# Patient Record
(demographics unavailable — no encounter records)

---

## 2024-10-09 NOTE — PHYSICAL EXAM
[No Acute Distress] : no acute distress [Well Nourished] : well nourished [Well Developed] : well developed [Well-Appearing] : well-appearing [Normal Voice/Communication] : normal voice/communication [Normal Sclera/Conjunctiva] : normal sclera/conjunctiva [PERRL] : pupils equal round and reactive to light [EOMI] : extraocular movements intact [Normal Outer Ear/Nose] : the outer ears and nose were normal in appearance [Normal Oropharynx] : the oropharynx was normal [Normal TMs] : both tympanic membranes were normal [Normal Nasal Mucosa] : the nasal mucosa was normal [No JVD] : no jugular venous distention [No Lymphadenopathy] : no lymphadenopathy [Supple] : supple [Thyroid Normal, No Nodules] : the thyroid was normal and there were no nodules present [No Respiratory Distress] : no respiratory distress  [No Accessory Muscle Use] : no accessory muscle use [Clear to Auscultation] : lungs were clear to auscultation bilaterally [Normal Rate] : normal rate  [Regular Rhythm] : with a regular rhythm [Normal S1, S2] : normal S1 and S2 [No Carotid Bruits] : no carotid bruits [No Abdominal Bruit] : a ~M bruit was not heard ~T in the abdomen [Pedal Pulses Present] : the pedal pulses are present [No Edema] : there was no peripheral edema [No Palpable Aorta] : no palpable aorta [No Extremity Clubbing/Cyanosis] : no extremity clubbing/cyanosis [Soft] : abdomen soft [Non Tender] : non-tender [Non-distended] : non-distended [No Masses] : no abdominal mass palpated [No HSM] : no HSM [Normal Bowel Sounds] : normal bowel sounds [Normal Posterior Cervical Nodes] : no posterior cervical lymphadenopathy [Normal Anterior Cervical Nodes] : no anterior cervical lymphadenopathy [No CVA Tenderness] : no CVA  tenderness [No Spinal Tenderness] : no spinal tenderness [Kyphosis] : kyphosis [No Joint Swelling] : no joint swelling [Grossly Normal Strength/Tone] : grossly normal strength/tone [No Rash] : no rash [Thin Hair] : thin hair [Coordination Grossly Intact] : coordination grossly intact [No Focal Deficits] : no focal deficits [Normal Gait] : normal gait [Deep Tendon Reflexes (DTR)] : deep tendon reflexes were 2+ and symmetric [Speech Grossly Normal] : speech grossly normal [Memory Grossly Normal] : memory grossly normal [Normal Affect] : the affect was normal [Alert and Oriented x3] : oriented to person, place, and time [Normal Mood] : the mood was normal [Normal Insight/Judgement] : insight and judgment were intact [de-identified] : with murmur  [de-identified] : obese [de-identified] : sharply demarcated hyper erythmatous rash b/l groin area  [de-identified] :  tender rates/palp LS spine  -  significant thenar atrophy bilateral hands decreased range of motion of cervical spine [de-identified] : Right foot wrapped in wearing out walking boot as per wound care clinic with

## 2024-10-09 NOTE — DATA REVIEWED
[No studies available for review at this time.] : No studies available for review at this time. [FreeTextEntry1] : [facility] Reviewed reference number is [id number]

## 2024-10-09 NOTE — PLAN
[FreeTextEntry1] : Cardiology hypertension - continue Losartan Potassium 50mg p.o.q.d. as directed, Rx , continue Metoprolol Succinate ER 50mg p.o.q.d. as directed, Rx filled - continue low sodium diet.  Monitor BP. Refilled metoprolol today.  Advised patient to go for fasting blood work checking comprehensive metabolic as well as liver function test  hyperlipidemia - continue Rosuvastatin Calcium 20mg p.o.q.d. as directed - continue low cholesterol/low fat diet  CAD/MI - s/p stent placement (x3) - continue Clopidogrel Bisulfate 75mg p.o.q.d. as directed; continue Aspirin 81mg p.o.q.d. as directed; continue Rosuvastatin Calcium 20mg p.o.q.d. as directed; continue  Continue to follow up with cardiologist, Dr. Schmitt. Prescription given for fasting blood work checking fasting lipid panel, comprehensive metabolic, thyroid function test  Pulmonary occasional cigar smoker - previously discussed the importance of smoking cessation in reducing risk for CV disease/lung cancer/oral cancer  Endocrinology diabetes - ADA advised - continue ozempic subcutaneous injection weekly as directed; continue Toujeo 30-unit subcutaneous injection q.d. as directed; continue Jardiance 25mg p.o.q.d. as directed and Metformin HCl 1000mg BID p.o.q.d. as directed, Rx filled; continue Rosuvastatin Calcium 20mg p.o.q.d. as directed; continue Losartan Potassium 50mg p.o.q.d. as directed, Rx filled - continue low carbohydrate/low sugar diet; CV exercise limited by orthopedic issues - continue to follow up with endocrinologist, Dr. Clarke-  DM polyneuropathy-RX lyrica 200 mg po TID prn refilled 90 w/ refill ( checked [id number])- Refilled medication Check labs- follow up with Endocrinology Foot also-advise good glycemic control.  Follow-up podiatrist as well as wound care.  GI - advised the importance of colon cancer screening.  Patient reluctant to go for colonoscopy.  Ordered FIT DNA.  Education was provided. encouraged to reconsider going for a colonoscopy.  GERD - continue Pantoprazole Sodium 40mg p.o.q.d. as directed - continue antireflux measures. refilled protonix.  Neurology neuropathy - continue Lyrica (Pregabalin) 200mg tid p.o.q.d. as directed, Rx filled,  reviewed;    Rxn refilled.   Podiatry Continue to follow up with podiatrist, Dr. Beaulieu.-Continue wound care clinic until ulcer is healed.  May consider hyperbaric chamber  Musculoskeletal history of cervical spinal stenosis/myelopathy and lumbar back pain - s/p cervical spinal fusion; s/p lumbar laminectomy - continue Oxycodone-Acetaminophen 10-325mg p.o. p.r.n. as directed; continue Carisoprodol 350mg p.o. as directed, Rx filled,  reviewed. - Continue to follow up with neurosurgeon, Dr. Cabrera.   Psychiatry anxiety - continue Alprazolam 1mg p.o. p.r.n. as directed.   Patient really takes the medication.  Advised drug to drug interaction with muscle relaxer carisoprodol.   Urology BPH/ED - continue to follow up with urologist, Dr. Yu; prostate examination performed annually by Dr. Yu. Check PSA level (rxn given)  Neurology-short-term memory loss-advised to follow-up with neurology referral given.  Advised multivitamin daily.  Participated in reading her crossword puzzles.  Immunization will call local pharmacy for flu VAX information.  I spent 40 Minutes with the patient, half of which we discussed finding on physical exam  and coordinated care.  As well as reviewed my plans and follow ups. Dragon speech recognition software was used to create portions of this document.  An attempt at proofreading has been made to minimize errors please call if any questions arise.

## 2024-10-09 NOTE — COUNSELING
[Encouraged to increase physical activity] : Encouraged to increase physical activity [Decrease Portions] : decrease portions [Good understanding] : Patient has a good understanding of disease, goals and obesity follow-up plan [FreeTextEntry2] : ada ,  low-fat low-cholesterol low-sodium diet

## 2024-10-09 NOTE — HEALTH RISK ASSESSMENT
[Intercurrent Urgi Care visits] : went to urgent care [Yes] : Yes [2 - 3 times a week (3 pts)] : 2 - 3  times a week (3 points) [1 or 2 (0 pts)] : 1 or 2 (0 points) [Less than monthly (1 pt)] : Less than monthly (1 point) [No] : In the past 12 months have you used drugs other than those required for medical reasons? No [0] : 2) Feeling down, depressed, or hopeless: Not at all (0) [PHQ-2 Negative - No further assessment needed] : PHQ-2 Negative - No further assessment needed [Current] : Current [de-identified] : ear congestion/PND  [de-identified] : Jonah Clarke Endo,   Dr. Fredi Yu, uro,  Dr. Schmitt Cardio,  Dr. Alex Ortho,  Dr. Underwood Ortho, Dr. Oppenheim I&D,  Dr. Brittnee Mathews, Dr. Cabrera Neurosurgeon  [Audit-CScore] : 4 [[id number]] : 0 [de-identified] : cigar smoker one a week

## 2024-10-09 NOTE — REVIEW OF SYSTEMS
[Joint Pain] : joint pain [Joint Stiffness] : joint stiffness [Muscle Pain] : muscle pain [Back Pain] : back pain [Skin Rash] : skin rash [Anxiety] : anxiety [Negative] : Heme/Lymph [Memory Loss] : memory loss [Fever] : no fever [Chills] : no chills [Fatigue] : no fatigue [Discharge] : no discharge [Redness] : no redness [Itching] : no itching [Earache] : no earache [Nosebleeds] : no nosebleeds [Sore Throat] : no sore throat [Chest Pain] : no chest pain [Palpitations] : no palpitations [Claudication] : no  leg claudication [Lower Ext Edema] : no lower extremity edema [Orthopena] : no orthopnea [Shortness Of Breath] : no shortness of breath [Wheezing] : no wheezing [Cough] : no cough [Nausea] : no nausea [Constipation] : no constipation [Vomiting] : no vomiting [Melena] : no melena [Dysuria] : no dysuria [Incontinence] : no incontinence [Hesitancy] : no hesitancy [Nocturia] : no nocturia [Hematuria] : no hematuria [Joint Swelling] : no joint swelling [Headache] : no headache [Dizziness] : no dizziness [Fainting] : no fainting [Insomnia] : no insomnia [Easy Bleeding] : no easy bleeding [Easy Bruising] : no easy bruising [Swollen Glands] : no swollen glands [FreeTextEntry9] : neck, LBP [de-identified] : finger cyst  [de-identified] :  numbness lower extremity, short term memory loss  [FreeTextEntry1] : ulcer right foot

## 2024-10-09 NOTE — HISTORY OF PRESENT ILLNESS
[FreeTextEntry1] : Medication renewal, and medical update.  [de-identified] : Mr. CHAO is a 64 year-old-male, with a past medical history as noted below, who present to the office today for medication renewal. Patient is requesting renewal of Lyrica which she takes for diabetic neuropathy, metoprolol for hypertension/CAD, folic acid and propranolol which she takes for acid reflux.  Patient denies any adverse side effects to current medication regimen.  Continues to follow with pain management for chronic neck and lower back pain.  Patient states he takes oxycodone on a as needed basis for acute pain.  When he takes the medication does resolve his pain and discomfort. Patient states recently had a flu shot done at his pharmacy. Takes all medication as directed without side effects. Currently under wound care for right foot ulcer.  States he goes on a weekly basis for evaluation and treatment states it started to look like it is healing Patient states his endocrinologist discontinued Trulicity and started on his Ozempic.  Currently on Ozempic 1 mg weekly.

## 2024-10-09 NOTE — ASSESSMENT
[FreeTextEntry1] : Mr. CHAO is a 64 year-old male, with a past medical history as noted above, who present to the office today for medication renewal and follow-up from endocrinology, wound care clinic and discussion of memory loss
General (Pediatric)

## 2024-10-18 NOTE — END OF VISIT
[FreeTextEntry3] : Recommendation: Refill injectable products for erectile dysfunction.Follow up in one year or as needed.

## 2024-10-18 NOTE — HISTORY OF PRESENT ILLNESS
[FreeTextEntry1] : BRANDON CHAO is a 64 year male patient is here for a follow-up visit and reports doing well with the current treatment. The patient is seeking a refill on injectable products.

## 2024-10-18 NOTE — ADDENDUM
[FreeTextEntry1] : I, Danielle Melendez, acted as a scribe on behalf of Dr. Yu 10/17/2024.   All medical record entries made by Danielle Melendez were at my, Dr.Kip Yu, direction and personally dictated by me on  10/17/2024. I have reviewed the chart and agree that the record accurately reflects my personal performance of the history, physical exam, assessment, and plan. I have also personally directed, reviewed, and agreed with the chart.

## 2025-04-15 NOTE — HISTORY OF PRESENT ILLNESS
[FreeTextEntry1] :  annual wellness visit [de-identified] : New patient is a 64 year old male with a past medical history as below who presents for an annual wellness visit.   Patient has never had a screening colonoscopy. Patient received the flu vaccine for this season at [facility]. He believes he received the Pneumonia Vaccine at [facility] in the past. He has not received the Tetanus Vaccine in the past 10 years. He has received both doses of the Shingles (Shingrix) Vaccine. He is vaccinated against COVID-19 (J&J, x2).   Patient sees cardiologist, Dr. Schmitt given history of HTN, HLD, and CAD/MI: s/p stent placement x 3.  Patient sees endocrinologist, Dr. Clarke given history of DM; currently on Trulicity, Toujeo (30 units daily), Jardiance 25mg, and Metformin 1000mg BID;  Patient denies any gastrointestinal issues on Metformin. He denies any recent episodes of hypoglycemia.  Patient notes history of osteomyelitis; s/p courses of IV antibiotics. He had been seeing ID in the past. He regularly follows up with podiatrist, Dr. Beaulieu. He currently notes right heel wound that is not improving and he had vascular studies performed in [place] which demonstrated evidence of PAD.  He was referred by his cardiologist, Dr. Lauren for additional lower extremity arterial dopplers and consultation with vascular. He has gone to wound care without improvement. His 5th metatarsal was surgically removed in 2023.  Patient recently saw an optometrist for his eye exam.  Patient sees urologist, Dr. Yu given history of BPH and ED. He had been on Papaverine in the past for ED. He notes nocturia (1x per night). He gets an annual prostate exam performed by Dr. Yu yearly.  Patient notes history of cervical spinal stenosis/myelopathy and lumbar back pain; s/p cervical spinal fusion; s/p lumbar laminectomy. He had been seeing orthopedic surgeons, Dr. Alex and Dr. Underwood at [facility]; currently following with neurosurgeon, Dr. Cabrera. He takes Oxycodone-Acetaminophen and Carisoprodol infrequently/as needed for acute neck pain/back pain.   Patient notes bilateral lower extremity numbness (below knees). Patient is currently on Lyrica 200mg BID for neuropathy.  He notes tinnitis x months and requests referral to otolaryngologist.Ankures otalgia, otorrhea. He has had cerumen impaction in the past. He has tinea cruris which he has treated with nystatin powder in the past. He requests RX for this.

## 2025-04-15 NOTE — PHYSICAL EXAM
[No Acute Distress] : no acute distress [Well Nourished] : well nourished [Well Developed] : well developed [Well-Appearing] : well-appearing [Normal Voice/Communication] : normal voice/communication [Normal Sclera/Conjunctiva] : normal sclera/conjunctiva [PERRL] : pupils equal round and reactive to light [EOMI] : extraocular movements intact [Normal Outer Ear/Nose] : the outer ears and nose were normal in appearance [Normal Oropharynx] : the oropharynx was normal [Normal TMs] : both tympanic membranes were normal [Normal Nasal Mucosa] : the nasal mucosa was normal [No JVD] : no jugular venous distention [No Lymphadenopathy] : no lymphadenopathy [Supple] : supple [Thyroid Normal, No Nodules] : the thyroid was normal and there were no nodules present [No Respiratory Distress] : no respiratory distress  [No Accessory Muscle Use] : no accessory muscle use [Clear to Auscultation] : lungs were clear to auscultation bilaterally [Normal Rate] : normal rate  [Regular Rhythm] : with a regular rhythm [Normal S1, S2] : normal S1 and S2 [No Murmur] : no murmur heard [No Carotid Bruits] : no carotid bruits [No Abdominal Bruit] : a ~M bruit was not heard ~T in the abdomen [No Palpable Aorta] : no palpable aorta [Soft] : abdomen soft [Non Tender] : non-tender [Non-distended] : non-distended [No Masses] : no abdominal mass palpated [No HSM] : no HSM [Normal Bowel Sounds] : normal bowel sounds [No Hernias] : no hernias [Declined Rectal Exam] : declined rectal exam [Normal Supraclavicular Nodes] : no supraclavicular lymphadenopathy [Normal Posterior Cervical Nodes] : no posterior cervical lymphadenopathy [Normal Anterior Cervical Nodes] : no anterior cervical lymphadenopathy [No CVA Tenderness] : no CVA  tenderness [No Spinal Tenderness] : no spinal tenderness [Kyphosis] : no kyphosis [Scoliosis] : no scoliosis [No Joint Swelling] : no joint swelling [Grossly Normal Strength/Tone] : grossly normal strength/tone [Acne] : no acne [Coordination Grossly Intact] : coordination grossly intact [No Focal Deficits] : no focal deficits [Normal Gait] : normal gait [Deep Tendon Reflexes (DTR)] : deep tendon reflexes were 2+ and symmetric [Speech Grossly Normal] : speech grossly normal [Memory Grossly Normal] : memory grossly normal [Normal Affect] : the affect was normal [Alert and Oriented x3] : oriented to person, place, and time [Normal Mood] : the mood was normal [Normal Insight/Judgement] : insight and judgment were intact [de-identified] : upper dentures  [de-identified] : trace ankle pitting edema bilaterally [de-identified] : obese [de-identified] : skin breakdown right heel/foot, confluent, erythematous macular rash bilateral inner thigh [FreeTextEntry1] : rectal/prostate exam done yearly by Dr. Yu

## 2025-04-15 NOTE — ADDENDUM
[FreeTextEntry1] : I, Que Escobar, acted solely as scribe for Dr. Sheldon Tijerina DO on this date 10/31/2022  9:00AM .\par  \par  All medical record entries made by the Scribe were at my, Dr. Sheldon Tijerina DO direction and personally dictated by me on 10/31/2022  9:00AM. I have reviewed the chart and agree that the record accurately reflects my personal performance of the history, physical exam, assessment and plan. I have also personally directed, reviewed and agreed with the chart.

## 2025-04-15 NOTE — ASSESSMENT
[FreeTextEntry1] : New patient is a 62 year old male with a past medical history as above who presents for an annual wellness visit.\par   [Vaccines Reviewed] : Immunizations reviewed today. Please see immunization details in the vaccine log within the immunization flowsheet.

## 2025-04-15 NOTE — HEALTH RISK ASSESSMENT
[Yes] : Yes [2 - 3 times a week (3 pts)] : 2 - 3  times a week (3 points) [1 or 2 (0 pts)] : 1 or 2 (0 points) [Less than monthly (1 pt)] : Less than monthly (1 point) [No] : In the past 12 months have you used drugs other than those required for medical reasons? No [0] : 2) Feeling down, depressed, or hopeless: Not at all (0) [PHQ-2 Negative - No further assessment needed] : PHQ-2 Negative - No further assessment needed [Audit-CScore] : 4 [[id number]] : 0 [ColonoscopyComments] : Discussed seeing GI for consultation as patient is due for a screening colonoscopy, patient to consider and follow up for referral; also discussed FIT Test: kit given.

## 2025-04-15 NOTE — PLAN
[FreeTextEntry1] : Ophthalmology Results of recent eye exam to be requested from [facility] in [place] Pulmonary occasional cigar smoker - check EKG (results as above) - discussed the importance of smoking cessation in reducing risk for CV disease/lung cancer/oral cancer Cardiology hypertension - check EKG (results as above) - continue Losartan Potassium 50mg p.o.q.d. as directed; continue Metoprolol Succinate ER 50mg p.o.q.d. as directed - continue low sodium diet; will continue to monitor BP hyperlipidemia - continue Rosuvastatin Calcium 20mg p.o.q.d. as directed - continue low cholesterol/low fat diet  CAD/MI - s/p stent placement (x3) - continue Clopidogrel Bisulfate 75mg p.o.q.d. as directed; continue Aspirin 81mg p.o.q.d. as directed; continue Rosuvastatin Calcium 20mg p.o.q.d. as directed; continue Metoprolol Succinate ER 50mg p.o.q.d. as directed  Continue to follow up with cardiologist, Dr. Schmitt Endocrinology diabetes - continue Ozempic subcutaneous injection q.w. as directed; continue Toujeo 30-unit subcutaneous injection q.d. as directed; continue Jardiance 25mg p.o.q.d. as directed and Metformin HCl 1000mg BID p.o.q.d. as directed; continue Rosuvastatin Calcium 20mg p.o.q.d. as directed; continue Losartan Potassium 50mg p.o.q.d. as directed - continue low carbohydrate/low sugar diet; CV exercise limited by orthopedic issues - continue to follow up with endocrinologist, Dr. Clarke obesity - continue low carbohydrate diet; CV exercise limited by orthopedic issues  Gastroenterology Discussed seeing GI for consultation as patient is due for a screening colonoscopy, patient to consider and follow up for referral; also discussed FIT Test: kit given GERD - continue Pantoprazole Sodium 40mg p.o.q.d. as directed, Rx filled - continue antireflux measures Neurology neuropathy - continue Lyrica (Pregabalin) 200mg BID p.o.q.d. as directed Podiatry onychomycosis - if interested in treatment (Lamisil vs. laser), recommended following up with podiatrist, Dr. Beaulieu  Continue to follow up with Dr. Beaulieu given history of DM and osteomyelitis  Musculoskeletal history of cervical spinal stenosis/myelopathy and lumbar back pain - s/p cervical spinal fusion; s/p lumbar laminectomy - continue Oxycodone-Acetaminophen 10-325mg p.o. p.r.n. as directed; continue Carisoprodol 350mg p.o. as directed - continue to follow up with neurosurgeon, Dr. Cabrera.  Psychiatry anxiety - continue Alprazolam 1mg p.o. p.r.n. as directed Urology BPH/ED - continue to follow up with urologist, Dr. Yu; prostate examination performed annually by Dr. Yu Dermatology tinea cruris-RX lotrisone cream apply small amount BID x 10 days and prn Otolaryngology left tinnitis-referred to ENT, Dr. Benjamín Rosas for evaluation Vascular PAD-to see vascular and get imaging of lower extremity arteries-may require revascularization vs angioplasty w stent Immunization Patient's immunization records to be requested from [facility] Tdap (Adacel) Vaccine - discussed, patient to consider and follow up for vaccine administration if interested S/p COVID-19 Vaccine (x2) - recommended following up at pharmacy for updated COVID-19 vaccine booster  RX given for fasting blood work

## 2025-04-15 NOTE — REVIEW OF SYSTEMS
[Nocturia] : nocturia [Impotence] : impotence [Back Pain] : back pain [Itching] : itching [Skin Rash] : skin rash [Negative] : Heme/Lymph [FreeTextEntry4] : left tinnitis [de-identified] : lower extremity numbness

## 2025-04-18 NOTE — REASON FOR VISIT
[Consultation] : a consultation regarding [Peripheral Vascular Disease] : peripheral vascular disease [FreeTextEntry1] : 65 yo M with PMHx of CAD s/ PCI to LAD and Ramus, HTN, HL, DM, OM and Ulcer to R Plantar surface 4 years ago healed conservative management, offloading, ABX, OM and wound of L Hallux 12/2021 post trauma, treated with ABX and healed, and recurrent OM s/p 5th Ray excision 2/2024, here for vascular medicine evaluation for nonhealing right foot ulcer.   Small nonhealing wound on R heel. Has been dealing with this for months. Has been doing wound care . Sees Dr. Beaulieu.  He spends time in [place], had a recent arterial duplex done there with a ?SFA stenosis. No prior imaging. He has an arterial duplex set-up for Friday at noon at [facility].   Had MEME in 7/2018, RABI 1.78, LABI 1.86, calcified vessels and not accurate, was recommended he got a CTA Abd/Pelvis.   8/1/2024 TBI: The right TBI of 0.92 and the left TBI of 0.78 are normal. Significant lower extremity arterial vascular disease is not identified.  Arterial Duplex 10/25/2023: Artificially elevated ABIs bilaterally which limit accurate evaluation. Preserved bilateral lower extremity PVR amplitudes. Low impedance monophasic waveforms in the right calf may reflect hyperemia due to inflammation associated with the foot wound. Elevated velocities of the right dorsalis pedis artery, suggestive of flow-limiting stenosis. The left peroneal artery is occluded throughout.

## 2025-04-18 NOTE — REVIEW OF SYSTEMS
[Weight Gain (___ Lbs)] : [unfilled] ~Ulb weight gain [Feeling Fatigued] : feeling fatigued [Lower Ext Edema] : lower extremity edema [Leg Claudication] : intermittent leg claudication [Joint Pain] : joint pain [Joint Stiffness] : joint stiffness [Change In Color Of Skin] : change in skin color [Skin Lesions] : skin lesion(s): [Negative] : Heme/Lymph [Fever] : no fever [Chills] : no chills

## 2025-04-18 NOTE — REASON FOR VISIT
[Consultation] : a consultation regarding [Peripheral Vascular Disease] : peripheral vascular disease [FreeTextEntry1] : 65 yo M with PMHx of CAD s/ PCI to LAD and Ramus, HTN, HL, DM, OM and Ulcer to R Plantar surface 4 years ago healed conservative management, offloading, ABX, OM and wound of L Hallux 12/2021 post trauma, treated with ABX and healed, and recurrent OM s/p 5th Ray excision 2/2024, here for vascular medicine evaluation for nonhealing right foot ulcer.   Small nonhealing wound on R heel. Has been dealing with this for months. Has been doing wound care . Sees Dr. Beaulieu.  He spends time in [place], had a recent arterial duplex done there with a ?SFA stenosis. No prior imaging. He has an arterial duplex set-up for Friday at noon at [facility].   Had MEME in 7/2018, RABI 1.78, LABI 1.86, calcified vessels and not accurate, was recommended he got a CTA Abd/Pelvis.   8/1/2024 TBI: The right TBI of 0.92 and the left TBI of 0.78 are normal. Significant lower extremity arterial vascular disease is not identified.  Arterial Duplex 10/25/2023: Artificially elevated ABIs bilaterally which limit accurate evaluation. Preserved bilateral lower extremity PVR amplitudes. Low impedance monophasic waveforms in the right calf may reflect hyperemia due to inflammation associated with the foot wound. Elevated velocities of the right dorsalis pedis artery, suggestive of flow-limiting stenosis. The left peroneal artery is occluded throughout.

## 2025-04-18 NOTE — PHYSICAL EXAM
[Eyelids - No Xanthelasma] : the eyelids demonstrated no xanthelasmas [No Oral Cyanosis] : no oral cyanosis [Heart Rate And Rhythm] : heart rate and rhythm were normal [Heart Sounds] : normal S1 and S2 [Murmurs] : no murmurs present [Abdomen Soft] : soft [Abdomen Tenderness] : non-tender [Oriented To Time, Place, And Person] : oriented to person, place, and time [Impaired Insight] : insight and judgment were intact [Affect] : the affect was normal [FreeTextEntry1] : early evidence of venous insufficiency, right heel ulcer , dopplerable DP

## 2025-05-06 NOTE — PHYSICAL EXAM
[No Acute Distress] : no acute distress [Well Nourished] : well nourished [Well Developed] : well developed [Well-Appearing] : well-appearing [Normal Voice/Communication] : normal voice/communication [Normal Sclera/Conjunctiva] : normal sclera/conjunctiva [PERRL] : pupils equal round and reactive to light [EOMI] : extraocular movements intact [Normal Outer Ear/Nose] : the outer ears and nose were normal in appearance [Normal Oropharynx] : the oropharynx was normal [Normal TMs] : both tympanic membranes were normal [Normal Nasal Mucosa] : the nasal mucosa was normal [No JVD] : no jugular venous distention [No Lymphadenopathy] : no lymphadenopathy [Supple] : supple [Thyroid Normal, No Nodules] : the thyroid was normal and there were no nodules present [No Respiratory Distress] : no respiratory distress  [No Accessory Muscle Use] : no accessory muscle use [Normal Rate] : normal rate  [Regular Rhythm] : with a regular rhythm [Normal S1, S2] : normal S1 and S2 [No Carotid Bruits] : no carotid bruits [No Abdominal Bruit] : a ~M bruit was not heard ~T in the abdomen [Pedal Pulses Present] : the pedal pulses are present [No Palpable Aorta] : no palpable aorta [No Extremity Clubbing/Cyanosis] : no extremity clubbing/cyanosis [Soft] : abdomen soft [Non Tender] : non-tender [Non-distended] : non-distended [No Masses] : no abdominal mass palpated [No HSM] : no HSM [Normal Bowel Sounds] : normal bowel sounds [Normal Posterior Cervical Nodes] : no posterior cervical lymphadenopathy [Normal Anterior Cervical Nodes] : no anterior cervical lymphadenopathy [No CVA Tenderness] : no CVA  tenderness [No Spinal Tenderness] : no spinal tenderness [Kyphosis] : kyphosis [No Joint Swelling] : no joint swelling [Grossly Normal Strength/Tone] : grossly normal strength/tone [No Rash] : no rash [Thin Hair] : thin hair [Coordination Grossly Intact] : coordination grossly intact [No Focal Deficits] : no focal deficits [Normal Gait] : normal gait [Deep Tendon Reflexes (DTR)] : deep tendon reflexes were 2+ and symmetric [Speech Grossly Normal] : speech grossly normal [Memory Grossly Normal] : memory grossly normal [Normal Affect] : the affect was normal [Alert and Oriented x3] : oriented to person, place, and time [Normal Mood] : the mood was normal [Normal Insight/Judgement] : insight and judgment were intact [de-identified] : Inspiratory wheezing [de-identified] : with murmur  [de-identified] : Right lower extremity edema slightly tender to palp of the calf muscle on the right lower extremity [de-identified] : obese [de-identified] : e [de-identified] : Right foot wrapped in wearing out walking boot as per wound care clinic with

## 2025-05-06 NOTE — HEALTH RISK ASSESSMENT
[Yes] : Yes [2 - 3 times a week (3 pts)] : 2 - 3  times a week (3 points) [1 or 2 (0 pts)] : 1 or 2 (0 points) [Less than monthly (1 pt)] : Less than monthly (1 point) [No] : In the past 12 months have you used drugs other than those required for medical reasons? No [0] : 2) Feeling down, depressed, or hopeless: Not at all (0) [PHQ-2 Negative - No further assessment needed] : PHQ-2 Negative - No further assessment needed [Audit-CScore] : 4 [[id number]] : 0 [Current] : Current [de-identified] : cigar [ColonoscopyComments] : Discussed seeing GI for consultation as patient is due for a screening colonoscopy, patient to consider and follow up for referral; also discussed FIT Test: kit given.

## 2025-05-06 NOTE — REVIEW OF SYSTEMS
[Joint Pain] : joint pain [Joint Stiffness] : joint stiffness [Muscle Pain] : muscle pain [Back Pain] : back pain [Skin Rash] : skin rash [Memory Loss] : memory loss [Anxiety] : anxiety [Negative] : Heme/Lymph [Fever] : no fever [Chills] : no chills [Fatigue] : no fatigue [Discharge] : no discharge [Redness] : no redness [Itching] : no itching [Earache] : no earache [Nosebleeds] : no nosebleeds [Nasal Discharge] : nasal discharge [Sore Throat] : no sore throat [Chest Pain] : no chest pain [Palpitations] : no palpitations [Claudication] : no  leg claudication [Lower Ext Edema] : no lower extremity edema [Orthopena] : no orthopnea [Shortness Of Breath] : no shortness of breath [Wheezing] : wheezing [Cough] : no cough [Nausea] : no nausea [Constipation] : no constipation [Vomiting] : no vomiting [Melena] : no melena [Dysuria] : no dysuria [Incontinence] : no incontinence [Hesitancy] : no hesitancy [Nocturia] : no nocturia [Hematuria] : no hematuria [Joint Swelling] : no joint swelling [Headache] : no headache [Dizziness] : no dizziness [Fainting] : no fainting [Insomnia] : no insomnia [Easy Bleeding] : no easy bleeding [Easy Bruising] : no easy bruising [Swollen Glands] : no swollen glands [FreeTextEntry9] : neck, LBP [de-identified] : fut ulcer  [de-identified] :  numbness lower extremity, short term memory loss  [FreeTextEntry1] : ulcer right foot

## 2025-05-06 NOTE — HISTORY OF PRESENT ILLNESS
[Cold Symptoms] : cold symptoms [Moderate] : moderate [___ Weeks ago] :  [unfilled] weeks ago [Gradual] : gradually [Constant] : constant [Congestion] : congestion [Cough] : cough [Sore Throat] : no sore throat [Wheezing] : no wheezing [Chills] : no chills [Anorexia] : no anorexia [Shortness Of Breath] : no shortness of breath [Earache] : no earache [Fatigue] : not fatigue [Headache] : no headache [Fever] : no fever [Activity] : with activity [Stable] : stable [FreeTextEntry8] : Patient denies take any home COVID test.  States that he is wheezing at nighttime.  Denies taking any medication over the counter.  Denies chest pain shortness of breath.  Recently been seen in podiatry as well as vascular secondary to right foot nonhealing ulcer.  Recently had a angiogram which was normal.  Had arterial Doppler lower extremity which was normal.  Still with leg swelling right lower extremity states as the day goes on leg gets larger.  Unaware what makes it better or worse.  Denies going for a venous Doppler.  Patient states he has a prescription to go for fasting blood work given by Dr. Tijerina has not gone for a as of yet planning to do it later this week

## 2025-05-06 NOTE — DATA REVIEWED
[No studies available for review at this time.] : No studies available for review at this time. [FreeTextEntry1] : [facility] Reviewed reference number is [id number]

## 2025-05-06 NOTE — PLAN
[FreeTextEntry1] : Cardiology hypertension - continue Losartan Potassium 50mg p.o.q.d. as directed, Rx , continue Metoprolol Succinate ER 50mg p.o.q.d. as directed, Rx filled - continue low sodium diet.  Monitor BP.  Advised patient to go for fasting blood work checking comprehensive metabolic as well as liver function test  hyperlipidemia - continue Rosuvastatin Calcium 20mg p.o.q.d. as directed - continue low cholesterol/low fat diet  CAD/MI - s/p stent placement (x3) - continue Clopidogrel Bisulfate 75mg p.o.q.d. as directed; continue Aspirin 81mg p.o.q.d. as directed; continue Rosuvastatin Calcium 20mg p.o.q.d. as directed; continue  Continue to follow up with cardiologist, Dr. Schmitt. pt has prescription  for fasting blood work   Vascular-peripheral edema-leg pain-referred patient for venous sonogram right lower extremity to rule out DVT.  Advised ROXANA stockings.  Follow-up with vascular.  Start hydrochlorothiazide 12.5.  Elevate leg.  Pulmonary occasional cigar smoker - previously discussed the importance of smoking cessation in reducing risk for CV disease/lung cancer/oral cancer  Endocrinology diabetes - ADA advised - continue Ozempic subcutaneous injection weekly as directed; continue Toujou 30-unit subcutaneous injection q.d. as directed; continue Jardiance 25mg p.o.q.d. as directed and Metformin HCl 1000mg BID p.o.q.d. as directed, Rx filled; continue Rosuvastatin Calcium 20mg p.o.q.d. as directed; continue Losartan Potassium 50mg p.o.q.d. as directed, Rx filled - continue low carbohydrate/low sugar diet; CV exercise limited by orthopedic issues - continue to follow up with endocrinologist, Dr. Clarke- Check a1c   DM polyneuropathy-RX lyrica 200 mg po TID prn refilled 90 w/ refill ( checked [id number])- Refilled medication Check labs- follow up with Endocrinology Foot ulcer -advise good glycemic control.  Follow-up podiatrist as well as wound care.  GERD - continue Pantoprazole Sodium 40mg p.o.q.d. as directed - continue Anti reflux measures. refilled protonix.  Neurology neuropathy - continue Lyrica (Pregabalin) 200mg tid p.o.q.d. as directed, Rx filled,  reviewed.    Podiatry Continue to follow up with podiatrist, Dr. Beaulieu.-Continue wound care clinic until ulcer is healed.  May consider hyperbaric chamber  Musculoskeletal history of cervical spinal stenosis/myelopathy and lumbar back pain - s/p cervical spinal fusion; s/p lumbar laminectomy - continue Oxycodone-Acetaminophen 10-325mg p.o. p.r.n. as directed; continue Carisoprodol 350mg p.o. as directed, Rx filled,  reviewed. - Continue to follow up with neurosurgeon, Dr. Cabrera.   Psychiatry anxiety - continue Alprazolam 1mg p.o. p.r.n. as directed.   Patient really takes the medication.  Advised drug to drug interaction with muscle relaxer carisoprodol.   Urology BPH/ED - continue to follow up with urologist, Dr. Yu; prostate examination performed annually by Dr. Yu.   Neurology-short-term memory loss-advised to follow-up with neurology referral given.  Advised multivitamin daily.    I spent 35 Minutes with the patient, half of which we discussed finding on physical exam  and coordinated care.  As well as reviewed my plans and follow ups. Dragon speech recognition software was used to create portions of this document.  An attempt at proofreading has been made to minimize errors please call if any questions arise.

## 2025-05-06 NOTE — ASSESSMENT
[FreeTextEntry1] : Mr. CHAO is a 64 year-old male, with a past medical history as noted above, who present to the office today for acute upper respiratory infection- and right lower extremity peripheral edema/ leg pain
Positive FIT (fecal immunochemical test)

## 2025-06-09 NOTE — ASSESSMENT
[FreeTextEntry1] : Mr. CHAO is a 64 year-old male, with a past medical history as noted above, who present to the office today for  right lower extremity peripheral edema/ leg pain

## 2025-06-09 NOTE — REVIEW OF SYSTEMS
[Nasal Discharge] : nasal discharge [Joint Pain] : joint pain [Joint Stiffness] : joint stiffness [Muscle Pain] : muscle pain [Back Pain] : back pain [Skin Rash] : skin rash [Memory Loss] : memory loss [Anxiety] : anxiety [Chills] : no chills [Fever] : no fever [Fatigue] : no fatigue [Discharge] : no discharge [Redness] : no redness [Itching] : no itching [Earache] : no earache [Sore Throat] : no sore throat [Nosebleeds] : no nosebleeds [Palpitations] : no palpitations [Chest Pain] : no chest pain [Claudication] : no  leg claudication [Lower Ext Edema] : lower extremity edema [Shortness Of Breath] : no shortness of breath [Orthopena] : no orthopnea [Wheezing] : no wheezing [Cough] : no cough [Nausea] : no nausea [Dyspnea on Exertion] : not dyspnea on exertion [Melena] : no melena [Vomiting] : no vomiting [Constipation] : no constipation [Incontinence] : no incontinence [Dysuria] : no dysuria [Nocturia] : no nocturia [Hesitancy] : no hesitancy [Joint Swelling] : no joint swelling [Hematuria] : no hematuria [Dizziness] : no dizziness [Headache] : no headache [Fainting] : no fainting [Insomnia] : no insomnia [Easy Bruising] : no easy bruising [Easy Bleeding] : no easy bleeding [Swollen Glands] : no swollen glands [Negative] : Respiratory [FreeTextEntry9] : neck, LBP [de-identified] : fut ulcer  [de-identified] :  numbness lower extremity, short term memory loss  [FreeTextEntry1] : ulcer right foot

## 2025-06-09 NOTE — HISTORY OF PRESENT ILLNESS
[FreeTextEntry1] : RLE swelling and pain  [de-identified] : Mr. CHAO is a 64 year-old-male, with a past medical history as noted below, who present to the office today for persistent RLE swelling and pain- Saw podiatry this am had DM ulcer cleaned and dressing applied. States he took HCTZ w/o any decrease in swelling.  Use compression sock w/o any relief states seemed like it caused more pain and swelling. Denies leg cramps  Seeing agatha on Monday

## 2025-06-09 NOTE — PLAN
[FreeTextEntry1] : Cardiology hypertension - continue Losartan Potassium 50mg p.o.q.d. as directed, Rx , continue Metoprolol Succinate ER 50mg p.o.q.d. as directed, Rx filled - continue low sodium diet.  Monitor BP.  Advised patient to go for fasting blood work checking comprehensive metabolic as well as liver function test  hyperlipidemia - continue Rosuvastatin Calcium 20mg p.o.q.d. as directed - continue low cholesterol/low fat diet  CAD/MI - s/p stent placement (x3) - continue Clopidogrel Bisulfate 75mg p.o.q.d. as directed; continue Aspirin 81mg p.o.q.d. as directed; continue Rosuvastatin Calcium 20mg p.o.q.d. as directed; continue  Continue to follow up with cardiologist, Dr. Schmitt. Check FLP/ LFT and renal function  Vascular-peripheral edema-leg pain- Elevated leg advised compression. Low na diet  Check labs - Start Lasix 20 mg daily - NO change consider titration Follow up with [facility]  Pulmonary occasional cigar smoker - previously discussed the importance of smoking cessation in reducing risk for CV disease/lung cancer/oral cancer  Endocrinology diabetes - ADA advised - continue Ozempic subcutaneous injection weekly as directed; continue Toujou 30-unit subcutaneous injection q.d. as directed; continue Jardiance 25mg p.o.q.d. as directed and Metformin HCl 1000mg BID p.o.q.d. as directed, Rx filled; continue Rosuvastatin Calcium 20mg p.o.q.d. as directed; continue Losartan Potassium 50mg p.o.q.d. as directed, Rx filled - continue low carbohydrate/low sugar diet; CV exercise limited by orthopedic issues - continue to follow up with endocrinologist, Dr. Clarke- Check a1c   DM polyneuropathy-RX lyrica 200 mg po TID  Check labs- follow up with Endocrinology  Foot ulcer -advise good glycemic control.  Follow-up podiatrist as well as wound care.  GERD - continue Pantoprazole Sodium 40mg p.o.q.d. as directed - continue Anti reflux measures.  Neurology neuropathy - continue Lyrica (Pregabalin) 200mg tid p.o.q.d. as directed,     Podiatry Continue to follow up with podiatrist, Dr. Beaulieu.-Continue wound care clinic until ulcer is healed.  May consider hyperbaric chamber  Musculoskeletal history of cervical spinal stenosis/myelopathy and lumbar back pain - s/p cervical spinal fusion; s/p lumbar laminectomy - Continue to follow up with neurosurgeon, Dr. Cabrera.   Psychiatry anxiety - continue Alprazolam 1mg p.o. p.r.n. as directed.   Patient really takes the medication.  Advised drug to drug interaction with muscle relaxer carisoprodol.   Urology BPH/ED - continue to follow up with urologist, Dr. Yu; prostate examination performed annually by Dr. Yu.   Neurology-short-term memory loss-advised to follow-up with neurology referral given.  Advised multivitamin daily.    I spent 35 Minutes with the patient, half of which we discussed finding on physical exam and coordinated care.  As well as reviewed my plans and follow ups. Dragon speech recognition software was used to create portions of this document.  An attempt at proofreading has been made to minimize errors please call if any questions arise.

## 2025-06-09 NOTE — REVIEW OF SYSTEMS
[Nasal Discharge] : nasal discharge [Joint Pain] : joint pain [Joint Stiffness] : joint stiffness [Muscle Pain] : muscle pain [Back Pain] : back pain [Skin Rash] : skin rash [Memory Loss] : memory loss [Anxiety] : anxiety [Chills] : no chills [Fever] : no fever [Fatigue] : no fatigue [Discharge] : no discharge [Itching] : no itching [Redness] : no redness [Nosebleeds] : no nosebleeds [Earache] : no earache [Sore Throat] : no sore throat [Palpitations] : no palpitations [Chest Pain] : no chest pain [Claudication] : no  leg claudication [Lower Ext Edema] : lower extremity edema [Shortness Of Breath] : no shortness of breath [Orthopena] : no orthopnea [Cough] : no cough [Wheezing] : no wheezing [Dyspnea on Exertion] : not dyspnea on exertion [Nausea] : no nausea [Constipation] : no constipation [Melena] : no melena [Vomiting] : no vomiting [Incontinence] : no incontinence [Dysuria] : no dysuria [Hesitancy] : no hesitancy [Nocturia] : no nocturia [Joint Swelling] : no joint swelling [Hematuria] : no hematuria [Dizziness] : no dizziness [Headache] : no headache [Insomnia] : no insomnia [Fainting] : no fainting [Easy Bleeding] : no easy bleeding [Easy Bruising] : no easy bruising [Negative] : Respiratory [Swollen Glands] : no swollen glands [FreeTextEntry9] : neck, LBP [de-identified] : fut ulcer  [de-identified] :  numbness lower extremity, short term memory loss  [FreeTextEntry1] : ulcer right foot

## 2025-06-09 NOTE — HEALTH RISK ASSESSMENT
[Yes] : Yes [2 - 3 times a week (3 pts)] : 2 - 3  times a week (3 points) [1 or 2 (0 pts)] : 1 or 2 (0 points) [Less than monthly (1 pt)] : Less than monthly (1 point) [No] : In the past 12 months have you used drugs other than those required for medical reasons? No [0] : 2) Feeling down, depressed, or hopeless: Not at all (0) [PHQ-2 Negative - No further assessment needed] : PHQ-2 Negative - No further assessment needed [Current] : Current [Audit-CScore] : 4 [[id number]] : 0 [de-identified] : cigar [ColonoscopyComments] : Discussed seeing GI for consultation as patient is due for a screening colonoscopy, patient to consider and follow up for referral; also discussed FIT Test: kit given.

## 2025-06-09 NOTE — PLAN
[FreeTextEntry1] : Cardiology hypertension - continue Losartan Potassium 50mg p.o.q.d. as directed, Rx , continue Metoprolol Succinate ER 50mg p.o.q.d. as directed, Rx filled - continue low sodium diet.  Monitor BP.  Advised patient to go for fasting blood work checking comprehensive metabolic as well as liver function test  hyperlipidemia - continue Rosuvastatin Calcium 20mg p.o.q.d. as directed - continue low cholesterol/low fat diet  CAD/MI - s/p stent placement (x3) - continue Clopidogrel Bisulfate 75mg p.o.q.d. as directed; continue Aspirin 81mg p.o.q.d. as directed; continue Rosuvastatin Calcium 20mg p.o.q.d. as directed; continue  Continue to follow up with cardiologist, Dr. Schmitt. Check FLP/ LFT and renal function  Vascular-peripheral edema-leg pain- Elevated leg advised compression. Low na diet  Check labs - Start Lasix 20 mg daily - NO change consider titration Follow up with [facility]  Pulmonary occasional cigar smoker - previously discussed the importance of smoking cessation in reducing risk for CV disease/lung cancer/oral cancer  Endocrinology diabetes - ADA advised - continue Ozempic subcutaneous injection weekly as directed; continue Toujou 30-unit subcutaneous injection q.d. as directed; continue Jardiance 25mg p.o.q.d. as directed and Metformin HCl 1000mg BID p.o.q.d. as directed, Rx filled; continue Rosuvastatin Calcium 20mg p.o.q.d. as directed; continue Losartan Potassium 50mg p.o.q.d. as directed, Rx filled - continue low carbohydrate/low sugar diet; CV exercise limited by orthopedic issues - continue to follow up with endocrinologist, Dr. Clarke- Check a1c   DM polyneuropathy-RX lyrica 200 mg po TID  Check labs- follow up with Endocrinology  Foot ulcer -advise good glycemic control.  Follow-up podiatrist as well as wound care.  GERD - continue Pantoprazole Sodium 40mg p.o.q.d. as directed - continue Anti reflux measures.  Neurology neuropathy - continue Lyrica (Pregabalin) 200mg tid p.o.q.d. as directed,     Podiatry Continue to follow up with podiatrist, Dr. Beaulieu.-Continue wound care clinic until ulcer is healed.  May consider hyperbaric chamber  Musculoskeletal history of cervical spinal stenosis/myelopathy and lumbar back pain - s/p cervical spinal fusion; s/p lumbar laminectomy - Continue to follow up with neurosurgeon, Dr. Cabrera.   Psychiatry anxiety - continue Alprazolam 1mg p.o. p.r.n. as directed.   Patient really takes the medication.  Advised drug to drug interaction with muscle relaxer carisoprodol.   Urology BPH/ED - continue to follow up with urologist, Dr. Yu; prostate examination performed annually by Dr. Yu.   Neurology-short-term memory loss-advised to follow-up with neurology referral given.  Advised multivitamin daily.    I spent 35 Minutes with the patient, half of which we discussed finding on physical exam and coordinated care.  As well as reviewed my plans and follow ups. Dragon speech recognition software was used to create portions of this document.  An attempt at proofreading has been made to minimize errors please call if any questions arise.

## 2025-06-09 NOTE — COUNSELING
Verified Results  MA US NDLE CORE CLIP 1LT WPPM SI [id number] 10:21AM 134[address] [facility], [address] [place]   Ordering Provider: Zulma Chan. Reason For Study: abn mammo,abn mammo. [2017 11:33AM Amelia Melissa  Reviewed. Mammogram and US left breast in 6 months. Test Name Result Flag Reference   MA US NDLE CORE CLIP 1LT WPPM SI (Report)     Accession #    [id number]    FINAL REPORT    #[id number] - MA US NDLE CORE CLIP 1LT WPPM SI  ULTRASOUND GUIDED BIOPSY LEFT BREAST WITH MARKING DEVICE INSERTED AND POST DIGITAL MAMMOGRAPHIC   AND ULTRASOUND IMAGIN2017    CLINICAL HISTORY:The patient presents for ultrasound guided biopsy of the mass in the left breast   at 9:00 4 cm from the nipple, initially detected on screening mammography. The patient has no   family history of breast cancer. PATIENT CONSENT: After describing the procedure with its risks (including moderate bleeding,   infection, and the possibility of surgical excision if the biopsy is unsuccessful), benefits,   alternatives and potential limitations, the patient's questions were answered, the patient   expressed verbal understanding and written informed consent was obtained. The patient also understands the importance of follow-up for the results of this procedure. She   was instructed to call the breast center in 3-5 days if she has not received the biopsy results   from her physician. Before beginning the procedure, a time out was performed including verifying patient identity,   date of birth, procedure being performed, correct breast and safety precautions. COMPARISON:   Comparison is made to exams dated: 2017 ultrasound, 2017 mammogram - [facility], and 2017 mammogram - [facility]. An ultrasound guided biopsy using real-time ultrasound was performed for the 1 cm mass located   in the left breast at 9 o'clock middle depth.  This was described on [Encouraged to increase physical activity] : Encouraged to increase physical activity the previous ultrasound   report. The skin was prepped in the usual manner. Local anesthetic was administered to the   access site. A skin nick was made in the breast. The abnormality was approached from the   medial aspect. A 12 gauge biopsy needle was placed adjacent to the abnormality through an   introducer device under ultrasound guidance. Once the needle was documented to be in the   correct location, five specimens were obtained using a BARD biopsy device. A ribbon clip was   inserted into the biopsy cavity. A skin closure strip was applied to the access site. Post   procedure digital mammographic and ultrasound imaging demonstrates the clip at the targeted   area. The specimens were sent to the laboratory for pathological analysis. IMPRESSION: ULTRASOUND GUIDED BIOPSY BENIGN  Ultrasound guided biopsy of the 1 cm mass in the left breast at 9 o'clock middle depth   was successful with no apparent post procedure complications. Pathology indicates benign   fibroadenoma (FA). Pathology results are concordant with mammography and ultrasound findings. As previously recommended, recommend 6 month left breast mammography and ultrasound follow up of   additional probably benign findings. A follow-up left mammogram and an ultrasound in 6 months is recommended to demonstrate stability.     Russ Jackson M.D.  [facility] [place] 48:90:[id number]    Imaging Technologist: Violet LINDSAY(EVARISTO)(NIKKI)(BS), [facility]      [id number]   **** FINAL ****    Dictated By:   Jerald Kumar MD    Electronically Reviewed and Approved By:  Jerald Kumar MD [Decrease Portions] : decrease portions [Good understanding] : Patient has a good understanding of disease, goals and obesity follow-up plan [FreeTextEntry2] : ada ,  low-fat low-cholesterol low-sodium diet

## 2025-06-09 NOTE — HISTORY OF PRESENT ILLNESS
[FreeTextEntry1] : RLE swelling and pain  [de-identified] : Mr. CHAO is a 64 year-old-male, with a past medical history as noted below, who present to the office today for persistent RLE swelling and pain- Saw podiatry this am had DM ulcer cleaned and dressing applied. States he took HCTZ w/o any decrease in swelling.  Use compression sock w/o any relief states seemed like it caused more pain and swelling. Denies leg cramps  Seeing agatha on Monday

## 2025-06-09 NOTE — HEALTH RISK ASSESSMENT
[Yes] : Yes [2 - 3 times a week (3 pts)] : 2 - 3  times a week (3 points) [1 or 2 (0 pts)] : 1 or 2 (0 points) [Less than monthly (1 pt)] : Less than monthly (1 point) [No] : In the past 12 months have you used drugs other than those required for medical reasons? No [0] : 2) Feeling down, depressed, or hopeless: Not at all (0) [PHQ-2 Negative - No further assessment needed] : PHQ-2 Negative - No further assessment needed [Current] : Current [Audit-CScore] : 4 [[id number]] : 0 [de-identified] : cigar [ColonoscopyComments] : Discussed seeing GI for consultation as patient is due for a screening colonoscopy, patient to consider and follow up for referral; also discussed FIT Test: kit given.

## 2025-06-09 NOTE — PHYSICAL EXAM
[No Acute Distress] : no acute distress [Well Nourished] : well nourished [Well Developed] : well developed [Well-Appearing] : well-appearing [Normal Voice/Communication] : normal voice/communication [Normal Sclera/Conjunctiva] : normal sclera/conjunctiva [PERRL] : pupils equal round and reactive to light [EOMI] : extraocular movements intact [Normal Outer Ear/Nose] : the outer ears and nose were normal in appearance [Normal Oropharynx] : the oropharynx was normal [Normal TMs] : both tympanic membranes were normal [Normal Nasal Mucosa] : the nasal mucosa was normal [No JVD] : no jugular venous distention [No Lymphadenopathy] : no lymphadenopathy [Supple] : supple [Thyroid Normal, No Nodules] : the thyroid was normal and there were no nodules present [No Respiratory Distress] : no respiratory distress  [No Accessory Muscle Use] : no accessory muscle use [Normal Rate] : normal rate  [Regular Rhythm] : with a regular rhythm [Normal S1, S2] : normal S1 and S2 [No Carotid Bruits] : no carotid bruits [No Abdominal Bruit] : a ~M bruit was not heard ~T in the abdomen [Pedal Pulses Present] : the pedal pulses are present [No Palpable Aorta] : no palpable aorta [No Extremity Clubbing/Cyanosis] : no extremity clubbing/cyanosis [Soft] : abdomen soft [Non Tender] : non-tender [Non-distended] : non-distended [No Masses] : no abdominal mass palpated [No HSM] : no HSM [Normal Bowel Sounds] : normal bowel sounds [Normal Posterior Cervical Nodes] : no posterior cervical lymphadenopathy [Normal Anterior Cervical Nodes] : no anterior cervical lymphadenopathy [No CVA Tenderness] : no CVA  tenderness [No Spinal Tenderness] : no spinal tenderness [Kyphosis] : kyphosis [No Joint Swelling] : no joint swelling [Grossly Normal Strength/Tone] : grossly normal strength/tone [No Rash] : no rash [Thin Hair] : thin hair [Coordination Grossly Intact] : coordination grossly intact [No Focal Deficits] : no focal deficits [Normal Gait] : normal gait [Deep Tendon Reflexes (DTR)] : deep tendon reflexes were 2+ and symmetric [Speech Grossly Normal] : speech grossly normal [Memory Grossly Normal] : memory grossly normal [Normal Affect] : the affect was normal [Alert and Oriented x3] : oriented to person, place, and time [Normal Mood] : the mood was normal [Normal Insight/Judgement] : insight and judgment were intact [de-identified] : Inspiratory wheezing [de-identified] : with murmur  [de-identified] : Right lower extremity edema 3 plus not wearing compression sock -  [de-identified] : e [de-identified] : obese [de-identified] : Right foot wrapped in wearing out walking boot as per wound care clinic with

## 2025-06-09 NOTE — DATA REVIEWED
[No studies available for review at this time.] : No studies available for review at this time. [FreeTextEntry1] : reviewed prior labs  Reviewed sono art. and vascular sonogram

## 2025-06-09 NOTE — PHYSICAL EXAM
[No Acute Distress] : no acute distress [Well Nourished] : well nourished [Well Developed] : well developed [Well-Appearing] : well-appearing [Normal Voice/Communication] : normal voice/communication [Normal Sclera/Conjunctiva] : normal sclera/conjunctiva [PERRL] : pupils equal round and reactive to light [EOMI] : extraocular movements intact [Normal Outer Ear/Nose] : the outer ears and nose were normal in appearance [Normal Oropharynx] : the oropharynx was normal [Normal TMs] : both tympanic membranes were normal [Normal Nasal Mucosa] : the nasal mucosa was normal [No JVD] : no jugular venous distention [No Lymphadenopathy] : no lymphadenopathy [Supple] : supple [Thyroid Normal, No Nodules] : the thyroid was normal and there were no nodules present [No Respiratory Distress] : no respiratory distress  [No Accessory Muscle Use] : no accessory muscle use [Normal Rate] : normal rate  [Regular Rhythm] : with a regular rhythm [Normal S1, S2] : normal S1 and S2 [No Carotid Bruits] : no carotid bruits [No Abdominal Bruit] : a ~M bruit was not heard ~T in the abdomen [Pedal Pulses Present] : the pedal pulses are present [No Palpable Aorta] : no palpable aorta [No Extremity Clubbing/Cyanosis] : no extremity clubbing/cyanosis [Soft] : abdomen soft [Non Tender] : non-tender [Non-distended] : non-distended [No Masses] : no abdominal mass palpated [No HSM] : no HSM [Normal Bowel Sounds] : normal bowel sounds [Normal Posterior Cervical Nodes] : no posterior cervical lymphadenopathy [Normal Anterior Cervical Nodes] : no anterior cervical lymphadenopathy [No CVA Tenderness] : no CVA  tenderness [No Spinal Tenderness] : no spinal tenderness [Kyphosis] : kyphosis [No Joint Swelling] : no joint swelling [Grossly Normal Strength/Tone] : grossly normal strength/tone [No Rash] : no rash [Thin Hair] : thin hair [Coordination Grossly Intact] : coordination grossly intact [No Focal Deficits] : no focal deficits [Normal Gait] : normal gait [Deep Tendon Reflexes (DTR)] : deep tendon reflexes were 2+ and symmetric [Speech Grossly Normal] : speech grossly normal [Memory Grossly Normal] : memory grossly normal [Normal Affect] : the affect was normal [Alert and Oriented x3] : oriented to person, place, and time [Normal Mood] : the mood was normal [Normal Insight/Judgement] : insight and judgment were intact [de-identified] : Inspiratory wheezing [de-identified] : with murmur  [de-identified] : Right lower extremity edema 3 plus not wearing compression sock -  [de-identified] : e [de-identified] : obese [de-identified] : Right foot wrapped in wearing out walking boot as per wound care clinic with

## 2025-06-14 NOTE — ASSESSMENT
[FreeTextEntry1] : Check labs today CBC, BMP, ESR and CRP HIE reviewed. 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 Start clindamycin (he does not like Doxycycline) and levofloxacin Ideally needs bone biopsy for definite diagnosis. He is not amenable to any invasive tests or procedures Would target MRSA and Serratia per wound culture (Don't think E faecalis needs to be covered) Needs aggressive wound care Podiatry follow up Off loading RTC in 3-4 weeks

## 2025-06-14 NOTE — PHYSICAL EXAM
[General Appearance - Alert] : alert [General Appearance - In No Acute Distress] : in no acute distress [General Appearance - Well Nourished] : well nourished [Extraocular Movements] : extraocular movements were intact [Hearing Threshold Finger Rub Not [place]] : hearing was normal [] : no respiratory distress [Heart Rate And Rhythm] : heart rate was normal and rhythm regular [Heart Sounds] : normal S1 and S2 [Abdomen Soft] : soft [Abdomen Tenderness] : non-tender [FreeTextEntry1] : RLE edema. Right foot lateral border open wound in proximity to the prior amputated fifth toe stump. Round border, base with purulent slough. No active drainage [Skin Color & Pigmentation] : normal skin color and pigmentation [No Focal Deficits] : no focal deficits [Oriented To Time, Place, And Person] : oriented to person, place, and time

## 2025-06-14 NOTE — PHYSICAL EXAM
[General Appearance - Alert] : alert [General Appearance - In No Acute Distress] : in no acute distress [General Appearance - Well Nourished] : well nourished [Extraocular Movements] : extraocular movements were intact [Hearing Threshold Finger Rub Not [place]] : hearing was normal [] : no respiratory distress [Heart Rate And Rhythm] : heart rate was normal and rhythm regular [Heart Sounds] : normal S1 and S2 [Abdomen Soft] : soft [Abdomen Tenderness] : non-tender [FreeTextEntry1] : RLE edema. Right foot lateral border open wound in proximity to the prior amputated fifth toe stump. Round border, base with purulent slough. No active drainage [Skin Color & Pigmentation] : normal skin color and pigmentation [No Focal Deficits] : no focal deficits [Oriented To Time, Place, And Person] : oriented to person, place, and time

## 2025-06-14 NOTE — DATA REVIEWED
[FreeTextEntry1] :  	 EXAM: [id number] - MR FOOT IC RT  - ORDERED BY: QUINTON CASTILLO   PROCEDURE DATE:  06/09/2025    INTERPRETATION:  Exam Type: MR FOOT WITH IV CONTRAST RIGHT Exam Date and Time: 6/9/2025 5:27 PM Indication: Concern for osteomyelitis of the fifth metatarsal base Comparison: Right foot radiograph on 10/21/2024  TECHNIQUE: Multiplanar multisequence MRI of the right foot was performed with and without contrast  Contrast: 10 cc IV Gadavist IV  FINDINGS:  Status post amputation of the fifth metatarsal distal to the base with marked osseous edema, loss of T1 fatty marrow, and postcontrast enhancement within the stump corresponding to osteomyelitis. Soft tissue wound at the plantar aspect of the fifth metatarsal base.  Additional osseous edema within the plantar aspect of the cuboid and plantar anterior calcaneus without significant loss of T1 fatty marrow with mild postcontrast enhancement. This could be reactive in etiology versus secondary to early osteomyelitis.  Moderate osseous edema within the second proximal metatarsal shaft with minimal loss of T1 fatty marrow and mild postcontrast enhancement. Minimal osseous edema within the third and fourth proximal metatarsal shafts without significant loss of T1 fatty marrow and minimal postcontrast enhancement. This could be reactive in etiology versus secondary to early osteomyelitis.  Mild first metatarsophalangeal joint osteoarthritis  There is edema within the subcutaneous tissues of the dorsal aspect of the foot and surrounding the fifth metatarsal base.  Fatty atrophy intrinsic muscles of the foot.  IMPRESSION: 1.  Osteomyelitis of the fifth metatarsal base with adjacent soft tissue wound. 2.  Additional osseous edema within the plantar aspect of the cuboid and plantar anterior calcaneus without significant loss of T1 fatty marrow with mild postcontrast enhancement. This could be reactive in etiology versus secondary to early osteomyelitis. 3.  Moderate osseous edema within the second proximal metatarsal shaft with minimal loss of T1 fatty marrow and mild postcontrast enhancement. Minimal osseous edema within the third and fourth proximal metatarsal shafts without significant loss of T1 fatty marrow and minimal postcontrast enhancement. This could be reactive in etiology versus secondary to early osteomyelitis.  --- End of Report ---

## 2025-06-14 NOTE — PHYSICAL EXAM
[General Appearance - Alert] : alert [General Appearance - In No Acute Distress] : in no acute distress [General Appearance - Well Nourished] : well nourished [Extraocular Movements] : extraocular movements were intact [Hearing Threshold Finger Rub Not [place]] : hearing was normal [] : no respiratory distress [Heart Rate And Rhythm] : heart rate was normal and rhythm regular [Heart Sounds] : normal S1 and S2 [Abdomen Soft] : soft [Abdomen Tenderness] : non-tender [FreeTextEntry1] : RLE edema. Right foot lateral border open wound in proximity to the prior amputated fifth toe stump. Round border, base with purulent slough. No active drainage [Skin Color & Pigmentation] : normal skin color and pigmentation [No Focal Deficits] : no focal deficits [Oriented To Time, Place, And Person] : oriented to person, place, and time

## 2025-06-14 NOTE — DATA REVIEWED
[FreeTextEntry1] :  	 EXAM: [id number] - MR FOOT IC RT  - ORDERED BY: QUINTON CASTILLO   PROCEDURE DATE:  06/09/2025    INTERPRETATION:  Exam Type: MR FOOT WITH IV CONTRAST RIGHT Exam Date and Time: 6/9/2025 5:27 PM Indication: Concern for osteomyelitis of the fifth metatarsal base Comparison: Right foot radiograph on 10/21/2024  TECHNIQUE: Multiplanar multisequence MRI of the right foot was performed with and without contrast  Contrast: 10 cc IV Gadavist IV  FINDINGS:  Status post amputation of the fifth metatarsal distal to the base with marked osseous edema, loss of T1 fatty marrow, and postcontrast enhancement within the stump corresponding to osteomyelitis. Soft tissue wound at the plantar aspect of the fifth metatarsal base.  Additional osseous edema within the plantar aspect of the cuboid and plantar anterior calcaneus without significant loss of T1 fatty marrow with mild postcontrast enhancement. This could be reactive in etiology versus secondary to early osteomyelitis.  Moderate osseous edema within the second proximal metatarsal shaft with minimal loss of T1 fatty marrow and mild postcontrast enhancement. Minimal osseous edema within the third and fourth proximal metatarsal shafts without significant loss of T1 fatty marrow and minimal postcontrast enhancement. This could be reactive in etiology versus secondary to early osteomyelitis.  Mild first metatarsophalangeal joint osteoarthritis  There is edema within the subcutaneous tissues of the dorsal aspect of the foot and surrounding the fifth metatarsal base.  Fatty atrophy intrinsic muscles of the foot.  IMPRESSION: 1.  Osteomyelitis of the fifth metatarsal base with adjacent soft tissue wound. 2.  Additional osseous edema within the plantar aspect of the cuboid and plantar anterior calcaneus without significant loss of T1 fatty marrow with mild postcontrast enhancement. This could be reactive in etiology versus secondary to early osteomyelitis. 3.  Moderate osseous edema within the second proximal metatarsal shaft with minimal loss of T1 fatty marrow and mild postcontrast enhancement. Minimal osseous edema within the third and fourth proximal metatarsal shafts without significant loss of T1 fatty marrow and minimal postcontrast enhancement. This could be reactive in etiology versus secondary to early osteomyelitis.  --- End of Report ---

## 2025-06-14 NOTE — HISTORY OF PRESENT ILLNESS
[FreeTextEntry1] : 65 y/o man with PMH of HTN, HLD, DM, neuropathy, chronic LE edema, and chronic R foot ulcer presenting to ID office for evaluation of osteomyelitis of right foot. Patient states that he has been dealing with non-healing wound of the R foot for almost a year. He had R fifth metatarsal amputation about 2 years ago. He underwent vascular work up for PAD as well. He is in follow up with podiatry and wound care for this wound. Patient is taking amoxicillin for past few weeks. He has no fever, chills or sweats. He is c/o severe pain and swelling of the right leg and foot. MRI of the foot was performed on 6/9 that is concerning for early osteomyelitis. He is hoping to avoid further amputations and would like to see if antibiotics would help this heal.

## 2025-06-14 NOTE — HISTORY OF PRESENT ILLNESS
[FreeTextEntry1] : 63 y/o man with PMH of HTN, HLD, DM, neuropathy, chronic LE edema, and chronic R foot ulcer presenting to ID office for evaluation of osteomyelitis of right foot. Patient states that he has been dealing with non-healing wound of the R foot for almost a year. He had R fifth metatarsal amputation about 2 years ago. He underwent vascular work up for PAD as well. He is in follow up with podiatry and wound care for this wound. Patient is taking amoxicillin for past few weeks. He has no fever, chills or sweats. He is c/o severe pain and swelling of the right leg and foot. MRI of the foot was performed on 6/9 that is concerning for early osteomyelitis. He is hoping to avoid further amputations and would like to see if antibiotics would help this heal.

## 2025-06-14 NOTE — DATA REVIEWED
[FreeTextEntry1] :  	 EXAM: [id number] - MR FOOT IC RT  - ORDERED BY: QUINTON CASTILLO   PROCEDURE DATE:  06/09/2025    INTERPRETATION:  Exam Type: MR FOOT WITH IV CONTRAST RIGHT Exam Date and Time: 6/9/2025 5:27 PM Indication: Concern for osteomyelitis of the fifth metatarsal base Comparison: Right foot radiograph on 10/21/2024  TECHNIQUE: Multiplanar multisequence MRI of the right foot was performed with and without contrast  Contrast: 10 cc IV Gadavist IV  FINDINGS:  Status post amputation of the fifth metatarsal distal to the base with marked osseous edema, loss of T1 fatty marrow, and postcontrast enhancement within the stump corresponding to osteomyelitis. Soft tissue wound at the plantar aspect of the fifth metatarsal base.  Additional osseous edema within the plantar aspect of the cuboid and plantar anterior calcaneus without significant loss of T1 fatty marrow with mild postcontrast enhancement. This could be reactive in etiology versus secondary to early osteomyelitis.  Moderate osseous edema within the second proximal metatarsal shaft with minimal loss of T1 fatty marrow and mild postcontrast enhancement. Minimal osseous edema within the third and fourth proximal metatarsal shafts without significant loss of T1 fatty marrow and minimal postcontrast enhancement. This could be reactive in etiology versus secondary to early osteomyelitis.  Mild first metatarsophalangeal joint osteoarthritis  There is edema within the subcutaneous tissues of the dorsal aspect of the foot and surrounding the fifth metatarsal base.  Fatty atrophy intrinsic muscles of the foot.  IMPRESSION: 1.  Osteomyelitis of the fifth metatarsal base with adjacent soft tissue wound. 2.  Additional osseous edema within the plantar aspect of the cuboid and plantar anterior calcaneus without significant loss of T1 fatty marrow with mild postcontrast enhancement. This could be reactive in etiology versus secondary to early osteomyelitis. 3.  Moderate osseous edema within the second proximal metatarsal shaft with minimal loss of T1 fatty marrow and mild postcontrast enhancement. Minimal osseous edema within the third and fourth proximal metatarsal shafts without significant loss of T1 fatty marrow and minimal postcontrast enhancement. This could be reactive in etiology versus secondary to early osteomyelitis.  --- End of Report ---

## 2025-07-13 NOTE — PLAN
[FreeTextEntry1] : Orthopedic-lower back pain-counseling given to the patient about the drug drug interaction between Carisoprodol,  Tj oxycodone and Xanax.  Patient states he is no longer taking Xanax for several weeks.  States he only takes oxycodone on needed basis.  Is aware not to take oxycodone when on the muscle relaxer. Education was provided to the patient again about drug to drug interaction and increased central nervous system depression  Infectious disease-osteomyelitis of the right foot.  Advised patient to reconsider going into the hospital for treatment.  Advised patient to follow back up with infectious disease and discuss IV antibiotic therapy should also go for bone biopsy.  Patient understands and unfortunately we will continue his current therapy.  States he does not want a pulm biopsy nor does he want to go on IV antibiotics at this time wants to see how the oral antibiotics go with hyperbaric chamber. Advised risk of loosening foot.  As well as severe sepsis.  I spent 30 Minutes with the patient, half of which we discussed finding on physical exam and coordinated care.  As well as reviewed my plans and follow ups. Dragon speech recognition software was used to create portions of this document.  An attempt at proofreading has been made to minimize errors please call if any questions arise.   Advised to follow-up with infectious disease, podiatrist, orthopedic and wound care

## 2025-07-13 NOTE — HISTORY OF PRESENT ILLNESS
[FreeTextEntry1] : Medication renewal for LBP [de-identified] : Mr. CHAO is a 65 year-old-male, with a past medical history as noted below, who present to the office today for patient states he takes Soma for his back spasms when it occurs.Medication managementFor chronic lower back pain.  Has tried other muscle relaxers without any relief.  States since he has been having right lower extremity pain due to diabetic ulcer as well as osteomyelitis he has been walking off balance causing exacerbation of his low back pain.  States the only thing that usually helps him is when he takes Soma.  Patient states he has not taken any Xanax for several weeks.  States he only takes oxycodone prescribed by pain management.   States has been following up with his podiatrist, wound care clinic as well as infectious disease.  Patient states he declined going for needle biopsy.  Currently on oral antibiotics as prescribed by infectious disease.  States he goes to wound care on a regular basis and is doing hyperbaric chamber which seems to be helping Denies having any fever-still with lower extremity swelling as well as pain and nonhealing ulcer in the midfoot

## 2025-07-13 NOTE — ASSESSMENT
[FreeTextEntry1] : Mr. CHAO is a 64 year-old male, with a past medical history as noted above, who present to the office today for medication renewal for lower back spasm/lower back pain

## 2025-07-13 NOTE — HISTORY OF PRESENT ILLNESS
[FreeTextEntry1] : Medication renewal for LBP [de-identified] : Mr. CHAO is a 65 year-old-male, with a past medical history as noted below, who present to the office today for patient states he takes Soma for his back spasms when it occurs.Medication managementFor chronic lower back pain.  Has tried other muscle relaxers without any relief.  States since he has been having right lower extremity pain due to diabetic ulcer as well as osteomyelitis he has been walking off balance causing exacerbation of his low back pain.  States the only thing that usually helps him is when he takes Soma.  Patient states he has not taken any Xanax for several weeks.  States he only takes oxycodone prescribed by pain management.   States has been following up with his podiatrist, wound care clinic as well as infectious disease.  Patient states he declined going for needle biopsy.  Currently on oral antibiotics as prescribed by infectious disease.  States he goes to wound care on a regular basis and is doing hyperbaric chamber which seems to be helping Denies having any fever-still with lower extremity swelling as well as pain and nonhealing ulcer in the midfoot

## 2025-07-13 NOTE — CURRENT MEDS
[Takes medication as prescribed] : takes [None] : Patient does not have any barriers to medication adherence [Yes] : Reviewed medication list for presence of high-risk medications. [Opioids] : opioids [FreeTextEntry1] : Reviewed

## 2025-07-13 NOTE — HEALTH RISK ASSESSMENT
[Yes] : Yes [2 - 3 times a week (3 pts)] : 2 - 3  times a week (3 points) [1 or 2 (0 pts)] : 1 or 2 (0 points) [Less than monthly (1 pt)] : Less than monthly (1 point) [No] : In the past 12 months have you used drugs other than those required for medical reasons? No [0] : 2) Feeling down, depressed, or hopeless: Not at all (0) [PHQ-2 Negative - No further assessment needed] : PHQ-2 Negative - No further assessment needed [Audit-CScore] : 4 [[id number]] : 0 [Current] : Current [de-identified] : Cigar smoker

## 2025-07-13 NOTE — PHYSICAL EXAM
[No Acute Distress] : no acute distress [Well Nourished] : well nourished [Well Developed] : well developed [Well-Appearing] : well-appearing [Normal Voice/Communication] : normal voice/communication [Normal Sclera/Conjunctiva] : normal sclera/conjunctiva [PERRL] : pupils equal round and reactive to light [EOMI] : extraocular movements intact [Normal Outer Ear/Nose] : the outer ears and nose were normal in appearance [Normal Oropharynx] : the oropharynx was normal [Normal TMs] : both tympanic membranes were normal [Normal Nasal Mucosa] : the nasal mucosa was normal [No JVD] : no jugular venous distention [No Lymphadenopathy] : no lymphadenopathy [Supple] : supple [Thyroid Normal, No Nodules] : the thyroid was normal and there were no nodules present [No Respiratory Distress] : no respiratory distress  [No Accessory Muscle Use] : no accessory muscle use [Normal Rate] : normal rate  [Regular Rhythm] : with a regular rhythm [Normal S1, S2] : normal S1 and S2 [No Carotid Bruits] : no carotid bruits [No Abdominal Bruit] : a ~M bruit was not heard ~T in the abdomen [Pedal Pulses Present] : the pedal pulses are present [No Palpable Aorta] : no palpable aorta [No Extremity Clubbing/Cyanosis] : no extremity clubbing/cyanosis [Soft] : abdomen soft [Non Tender] : non-tender [Non-distended] : non-distended [No Masses] : no abdominal mass palpated [No HSM] : no HSM [Normal Bowel Sounds] : normal bowel sounds [Normal Posterior Cervical Nodes] : no posterior cervical lymphadenopathy [Normal Anterior Cervical Nodes] : no anterior cervical lymphadenopathy [No CVA Tenderness] : no CVA  tenderness [No Spinal Tenderness] : no spinal tenderness [Kyphosis] : kyphosis [No Joint Swelling] : no joint swelling [Grossly Normal Strength/Tone] : grossly normal strength/tone [No Rash] : no rash [Thin Hair] : thin hair [Coordination Grossly Intact] : coordination grossly intact [No Focal Deficits] : no focal deficits [Normal Gait] : normal gait [Deep Tendon Reflexes (DTR)] : deep tendon reflexes were 2+ and symmetric [Speech Grossly Normal] : speech grossly normal [Memory Grossly Normal] : memory grossly normal [Normal Affect] : the affect was normal [Alert and Oriented x3] : oriented to person, place, and time [Normal Mood] : the mood was normal [Normal Insight/Judgement] : insight and judgment were intact [de-identified] : with murmur  [de-identified] : Right lower extremity edema 3 plus not wearing compression sock -   Right foot lateral aspect non healing foot ulcer [de-identified] : obese [de-identified] : Lumbar sacral spinal tenderness right paraspinal tenderness.  [de-identified] : Nonhealing foot ulcer on right lateral aspect of the foot

## 2025-07-13 NOTE — HEALTH RISK ASSESSMENT
[Yes] : Yes [2 - 3 times a week (3 pts)] : 2 - 3  times a week (3 points) [1 or 2 (0 pts)] : 1 or 2 (0 points) [Less than monthly (1 pt)] : Less than monthly (1 point) [No] : In the past 12 months have you used drugs other than those required for medical reasons? No [0] : 2) Feeling down, depressed, or hopeless: Not at all (0) [PHQ-2 Negative - No further assessment needed] : PHQ-2 Negative - No further assessment needed [Audit-CScore] : 4 [[id number]] : 0 [Current] : Current [de-identified] : Cigar smoker

## 2025-07-13 NOTE — REVIEW OF SYSTEMS
[Negative] : Heme/Lymph [Fever] : no fever [Chills] : no chills [Pain] : no pain [Vision Problems] : no vision problems [Joint Stiffness] : no joint stiffness [Muscle Pain] : muscle pain [Back Pain] : back pain [Joint Swelling] : no joint swelling [Headache] : no headache [Dizziness] : no dizziness [Fainting] : no fainting [Memory Loss] : no memory loss [Insomnia] : no insomnia [Easy Bruising] : no easy bruising [Swollen Glands] : no swollen glands [de-identified] : non healing dm ulcer right foot [de-identified] : hx neuropathy

## 2025-07-13 NOTE — REVIEW OF SYSTEMS
[Negative] : Heme/Lymph [Fever] : no fever [Chills] : no chills [Pain] : no pain [Vision Problems] : no vision problems [Joint Stiffness] : no joint stiffness [Muscle Pain] : muscle pain [Back Pain] : back pain [Joint Swelling] : no joint swelling [Headache] : no headache [Dizziness] : no dizziness [Fainting] : no fainting [Memory Loss] : no memory loss [Insomnia] : no insomnia [Easy Bruising] : no easy bruising [Swollen Glands] : no swollen glands [de-identified] : non healing dm ulcer right foot [de-identified] : hx neuropathy

## 2025-07-13 NOTE — PHYSICAL EXAM
[No Acute Distress] : no acute distress [Well Nourished] : well nourished [Well Developed] : well developed [Well-Appearing] : well-appearing [Normal Voice/Communication] : normal voice/communication [Normal Sclera/Conjunctiva] : normal sclera/conjunctiva [PERRL] : pupils equal round and reactive to light [EOMI] : extraocular movements intact [Normal Outer Ear/Nose] : the outer ears and nose were normal in appearance [Normal Oropharynx] : the oropharynx was normal [Normal TMs] : both tympanic membranes were normal [Normal Nasal Mucosa] : the nasal mucosa was normal [No JVD] : no jugular venous distention [No Lymphadenopathy] : no lymphadenopathy [Supple] : supple [Thyroid Normal, No Nodules] : the thyroid was normal and there were no nodules present [No Respiratory Distress] : no respiratory distress  [No Accessory Muscle Use] : no accessory muscle use [Normal Rate] : normal rate  [Regular Rhythm] : with a regular rhythm [Normal S1, S2] : normal S1 and S2 [No Carotid Bruits] : no carotid bruits [No Abdominal Bruit] : a ~M bruit was not heard ~T in the abdomen [Pedal Pulses Present] : the pedal pulses are present [No Palpable Aorta] : no palpable aorta [No Extremity Clubbing/Cyanosis] : no extremity clubbing/cyanosis [Soft] : abdomen soft [Non Tender] : non-tender [Non-distended] : non-distended [No Masses] : no abdominal mass palpated [No HSM] : no HSM [Normal Bowel Sounds] : normal bowel sounds [Normal Posterior Cervical Nodes] : no posterior cervical lymphadenopathy [Normal Anterior Cervical Nodes] : no anterior cervical lymphadenopathy [No CVA Tenderness] : no CVA  tenderness [No Spinal Tenderness] : no spinal tenderness [Kyphosis] : kyphosis [No Joint Swelling] : no joint swelling [Grossly Normal Strength/Tone] : grossly normal strength/tone [No Rash] : no rash [Thin Hair] : thin hair [Coordination Grossly Intact] : coordination grossly intact [No Focal Deficits] : no focal deficits [Normal Gait] : normal gait [Deep Tendon Reflexes (DTR)] : deep tendon reflexes were 2+ and symmetric [Speech Grossly Normal] : speech grossly normal [Memory Grossly Normal] : memory grossly normal [Normal Affect] : the affect was normal [Alert and Oriented x3] : oriented to person, place, and time [Normal Mood] : the mood was normal [Normal Insight/Judgement] : insight and judgment were intact [de-identified] : with murmur  [de-identified] : Right lower extremity edema 3 plus not wearing compression sock -   Right foot lateral aspect non healing foot ulcer [de-identified] : obese [de-identified] : Lumbar sacral spinal tenderness right paraspinal tenderness.  [de-identified] : Nonhealing foot ulcer on right lateral aspect of the foot

## 2025-07-21 NOTE — DATA REVIEWED
[FreeTextEntry1] : Pre-discharge  Pre-discharge  7/14 Vanco trough: 15  6/6 Would Culture (Podiatry) MRSA E. faecalis (amp S, Vanc s) Serratia (Ceph1 / Ceph2 resistance otherwise sensitive)   7/9 MR WIC IMPRESSION: 1.  Osteomyelitis of the fifth metatarsal base with adjacent soft tissue wound. 2.  Additional osseous edema within the plantar aspect of the cuboid and plantar anterior calcaneus without significant loss of T1 fatty marrow with mild postcontrast enhancement. This could be reactive in etiology  versus secondary to early osteomyelitis. 3.  Moderate osseous edema within the second proximal metatarsal shaft with minimal loss of T1 fatty marrow and mild postcontrast enhancement.  Minimal osseous edema within the third and fourth proximal metatarsal shafts without significant loss of T1 fatty marrow and minimal postcontrast enhancement. This could be reactive in etiology versus secondary to early osteomyelitis.  7/11 CT Patient is status post prior resection of the fifth toe and fifth ray to the base of the fifth metatarsal. Soft tissue wound is seen about the lateral aspect of the midfoot/forefoot in the region of the fifth MTP joint.  Extensive surrounding inflammatory changes are present with more generalized subcutaneous tissue edema and swelling with skin thickening across the remainder of the imaged lower extremity. Deep to the wound, there is osseous destruction and lucency within the residual fifth metatarsal base. This is compatible with osteomyelitis and was also noted at prior MR imaging.   7/12 MR [facility] MRI of the right foot demonstrates abnormal marrow signal throughout the midfoot tarsal bones and bases of the metatarsals with cortical destruction at the second metatarsal base consistent with acute osseous myelitis. . Findings are consistent with acute osteomyelitis. Fluid is seen tracking throughout the mid foot and between the first metatarsal space without well-defined organized collection. Soft tissue ulceration at the lateral midfoot.

## 2025-07-21 NOTE — HISTORY OF PRESENT ILLNESS
[FreeTextEntry1] : 64 year old with h/o right 5th toe amputaion, chronic wound at base of 5th metatarsal, seen by ID 6/13 after 6/9 MRI concerning for early OM. Started on Clindamycin and Levofloxacin.  Admitted to  on 7/11 for worsening left 5th metatarsal OM (under chronic skin ulcer) s/p 3 weeks of levofloxacin and clindamycin. Started on Vancomycin and zosyn in hospital, discharged on IV Vancomycin and Ertapenem (planned through 8/23). D/C'd 7/18. 7/11 Would culture taken by podiatry in ER with GPR on gram stain, Corynebacterium striatum isolated.   Does not think anything is improving since antibiotics although on further questioning seems to have significantly decrease drainage.    Underwent arterial doppler studies in April (see below) followed by angiogram - no report of angiogram found but reportedly no addressable lesions.   Podiatrist: Dr. Ralf Marie ([facility]) Home Care RN: Sandi Butler ([facility] [phone])

## 2025-07-21 NOTE — HISTORY OF PRESENT ILLNESS
[FreeTextEntry1] : 64 year old with h/o right 5th toe amputaion, chronic wound at base of 5th metatarsal, seen by ID 6/13 after 6/9 MRI concerning for early OM. Started on Clindamycin and Levofloxacin.  Admitted to  on 7/11 for worsening left 5th metatarsal OM (under chronic skin ulcer) s/p 3 weeks of levofloxacin and clindamycin. Started on Vancomycin and zosyn in hospital, discharged on IV Vancomycin and Ertapenem (planned through 8/23). D/C'd 7/18. 7/11 Would culture taken by podiatry in ER with GPR on gram stain, Corynebacterium striatum isolated.   Does not think anything is improving since antibiotics although on further questioning seems to have significantly decrease drainage.    Underwent arterial doppler studies in April (see below) followed by angiogram - no report of angiogram found but reportedly no addressable lesions.   Podiatrist: Dr. Ralf Marie ([facility]) Home Care RN: Sandi Butler ([facility] [phone])

## 2025-07-21 NOTE — ASSESSMENT
[FreeTextEntry1] : 65 year old with significant PVD and with chronic ulcer and significant foot OM. On optimal antimicrobial therapy, will need to maintain high vancomycin levels to optimize delivery to compromised tissue. Will discuss with vascular surgery regarding any options (e.g. venous arterialization) to improve LE perfusion? Will have office contact Radha to send labs to our office to manage OPAT from here forward Continued follow-up with wound care / hyperbaric therapy.